# Patient Record
(demographics unavailable — no encounter records)

---

## 2025-01-21 NOTE — HISTORY OF PRESENT ILLNESS
[FreeTextEntry1] : Pt comes in follow up LUTS on flomax with some hemp with his urination. Nocturia 4 to 2 times a night. Feels he is urinating more completely. Erections are good.   5/17/22 PSA 0.87  7/25/24 PSA 1.72